# Patient Record
Sex: FEMALE
[De-identification: names, ages, dates, MRNs, and addresses within clinical notes are randomized per-mention and may not be internally consistent; named-entity substitution may affect disease eponyms.]

---

## 2017-10-12 ENCOUNTER — ASOB RESULT (OUTPATIENT)
Age: 27
End: 2017-10-12

## 2017-10-12 ENCOUNTER — APPOINTMENT (OUTPATIENT)
Dept: ANTEPARTUM | Facility: CLINIC | Age: 27
End: 2017-10-12
Payer: COMMERCIAL

## 2017-10-12 PROBLEM — Z00.00 ENCOUNTER FOR PREVENTIVE HEALTH EXAMINATION: Status: ACTIVE | Noted: 2017-10-12

## 2017-10-12 PROCEDURE — 76830 TRANSVAGINAL US NON-OB: CPT

## 2017-10-12 PROCEDURE — 76857 US EXAM PELVIC LIMITED: CPT

## 2022-01-25 ENCOUNTER — APPOINTMENT (OUTPATIENT)
Dept: GASTROENTEROLOGY | Facility: CLINIC | Age: 32
End: 2022-01-25

## 2022-04-12 ENCOUNTER — RESULT REVIEW (OUTPATIENT)
Age: 32
End: 2022-04-12

## 2022-07-06 ENCOUNTER — OFFICE (OUTPATIENT)
Dept: URBAN - METROPOLITAN AREA CLINIC 34 | Facility: CLINIC | Age: 32
Setting detail: OPHTHALMOLOGY
End: 2022-07-06
Payer: MEDICAID

## 2022-07-06 DIAGNOSIS — H00.025: ICD-10-CM

## 2022-07-06 DIAGNOSIS — H16.223: ICD-10-CM

## 2022-07-06 DIAGNOSIS — R51.9: ICD-10-CM

## 2022-07-06 PROCEDURE — 92004 COMPRE OPH EXAM NEW PT 1/>: CPT | Performed by: OPHTHALMOLOGY

## 2022-07-06 ASSESSMENT — REFRACTION_CURRENTRX
OS_VPRISM_DIRECTION: SV
OD_OVR_VA: 20/
OS_CYLINDER: SPH
OD_CYLINDER: SPH
OS_OVR_VA: 20/
OD_VPRISM_DIRECTION: SV
OD_SPHERE: -0.75
OS_SPHERE: -1.00

## 2022-07-06 ASSESSMENT — REFRACTION_AUTOREFRACTION
OS_SPHERE: -1.75
OS_AXIS: 120
OD_AXIS: 010
OD_CYLINDER: +0.25
OS_CYLINDER: +0.50
OD_SPHERE: -1.25

## 2022-07-06 ASSESSMENT — KERATOMETRY
OD_K1POWER_DIOPTERS: 43.75
OS_K1POWER_DIOPTERS: 43.25
OD_K2POWER_DIOPTERS: 44.75
OS_K2POWER_DIOPTERS: 44.25
OS_AXISANGLE_DEGREES: 101
OD_AXISANGLE_DEGREES: 083

## 2022-07-06 ASSESSMENT — SPHEQUIV_DERIVED
OD_SPHEQUIV: -1.125
OS_SPHEQUIV: -1.5

## 2022-07-06 ASSESSMENT — AXIALLENGTH_DERIVED
OS_AL: 24.095
OD_AL: 23.7559

## 2022-07-06 ASSESSMENT — LID EXAM ASSESSMENTS
OS_MEIBOMITIS: LLL T
OS_COMMENTS: TELANGIECTASIA, HYPEREMIA, SHORTENED GLANDS
OD_COMMENTS: TELANGIECTASIA, HYPEREMIA

## 2022-07-06 ASSESSMENT — VISUAL ACUITY
OD_BCVA: 20/20-2
OS_BCVA: 20/20

## 2022-07-06 ASSESSMENT — CONFRONTATIONAL VISUAL FIELD TEST (CVF)
OD_FINDINGS: FULL
OS_FINDINGS: FULL

## 2022-07-06 ASSESSMENT — TONOMETRY: OD_IOP_MMHG: 20

## 2023-05-08 ENCOUNTER — APPOINTMENT (OUTPATIENT)
Dept: OBGYN | Facility: CLINIC | Age: 33
End: 2023-05-08
Payer: MEDICAID

## 2023-05-08 VITALS
SYSTOLIC BLOOD PRESSURE: 110 MMHG | DIASTOLIC BLOOD PRESSURE: 70 MMHG | HEIGHT: 62 IN | BODY MASS INDEX: 30.55 KG/M2 | WEIGHT: 166 LBS

## 2023-05-08 DIAGNOSIS — Z78.9 OTHER SPECIFIED HEALTH STATUS: ICD-10-CM

## 2023-05-08 DIAGNOSIS — Z01.419 ENCOUNTER FOR GYNECOLOGICAL EXAMINATION (GENERAL) (ROUTINE) W/OUT ABNORMAL FINDINGS: ICD-10-CM

## 2023-05-08 DIAGNOSIS — O02.1 MISSED ABORTION: ICD-10-CM

## 2023-05-08 LAB
BILIRUB UR QL STRIP: NORMAL
GLUCOSE UR-MCNC: NORMAL
HCG UR QL: 0.2 EU/DL
HGB UR QL STRIP.AUTO: NORMAL
KETONES UR-MCNC: NORMAL
LEUKOCYTE ESTERASE UR QL STRIP: NORMAL
NITRITE UR QL STRIP: NORMAL
PH UR STRIP: 6
PROT UR STRIP-MCNC: NORMAL
SP GR UR STRIP: 1

## 2023-05-08 PROCEDURE — 81003 URINALYSIS AUTO W/O SCOPE: CPT | Mod: QW

## 2023-05-08 PROCEDURE — 99385 PREV VISIT NEW AGE 18-39: CPT

## 2023-05-09 LAB
APPEARANCE: CLEAR
BACTERIA: NEGATIVE /HPF
BILIRUBIN URINE: NEGATIVE
BLOOD URINE: NEGATIVE
C TRACH RRNA SPEC QL NAA+PROBE: NOT DETECTED
CAST: 0 /LPF
COLOR: YELLOW
EPITHELIAL CELLS: 6 /HPF
GLUCOSE QUALITATIVE U: NEGATIVE MG/DL
HPV HIGH+LOW RISK DNA PNL CVX: NOT DETECTED
KETONES URINE: NEGATIVE MG/DL
LEUKOCYTE ESTERASE URINE: NEGATIVE
MICROSCOPIC-UA: NORMAL
N GONORRHOEA RRNA SPEC QL NAA+PROBE: NOT DETECTED
NITRITE URINE: NEGATIVE
PH URINE: 6.5
PROTEIN URINE: NEGATIVE MG/DL
RED BLOOD CELLS URINE: 1 /HPF
SOURCE AMPLIFICATION: NORMAL
SOURCE TP AMPLIFICATION: NORMAL
SPECIFIC GRAVITY URINE: 1.01
T VAGINALIS RRNA SPEC QL NAA+PROBE: NOT DETECTED
UROBILINOGEN URINE: 0.2 MG/DL
WHITE BLOOD CELLS URINE: 0 /HPF

## 2023-05-10 RX ORDER — AMOXICILLIN AND CLAVULANATE POTASSIUM 875; 125 MG/1; MG/1
875-125 TABLET, COATED ORAL
Qty: 14 | Refills: 0 | Status: COMPLETED | COMMUNITY
Start: 2023-03-15

## 2023-05-10 RX ORDER — RIZATRIPTAN BENZOATE 10 MG/1
10 TABLET ORAL
Qty: 9 | Refills: 0 | Status: COMPLETED | COMMUNITY
Start: 2023-01-22

## 2023-05-10 RX ORDER — NITROFURANTOIN (MONOHYDRATE/MACROCRYSTALS) 25; 75 MG/1; MG/1
100 CAPSULE ORAL
Qty: 10 | Refills: 0 | Status: COMPLETED | COMMUNITY
Start: 2023-04-30

## 2023-05-10 NOTE — REVIEW OF SYSTEMS
[Fatigue] : fatigue [Dyspnea] : dyspnea [SOB on Exertion] : shortness of breath on exertion [Chest Pain] : chest pain [Urgency] : urgency [Frequency] : frequency [Incontinence] : incontinence [Dysuria] : dysuria [Pelvic pain] : pelvic pain [Negative] : Heme/Lymph

## 2023-05-10 NOTE — HISTORY OF PRESENT ILLNESS
[FreeTextEntry1] : 33yo lmp 2023.\par \par Check up.  C/o chest pain, headaches, frequency, urgency, dysuria, dysparunia.  [PGHxTotal] : 4 [[facility] : 3 [PGHxPremature] : 0 [PGHxAbortions] : 1 [[facility]] [facility] [PGHxABSpont] : 1 [Regular Cycle Intervals] : periods have been regular [Frequency: Q ___ days] : menstrual periods occur approximately every [unfilled] days [Menarche Age: ____] : age at menarche was [unfilled] [Currently Active] : currently active [Men] : men [Vaginal] : vaginal

## 2023-05-11 ENCOUNTER — NON-APPOINTMENT (OUTPATIENT)
Age: 33
End: 2023-05-11

## 2023-05-11 LAB — CYTOLOGY CVX/VAG DOC THIN PREP: NORMAL

## 2023-05-11 RX ORDER — CEPHALEXIN 500 MG/1
500 CAPSULE ORAL 3 TIMES DAILY
Qty: 21 | Refills: 0 | Status: ACTIVE | COMMUNITY
Start: 2023-05-11 | End: 1900-01-01

## 2023-05-12 LAB — BACTERIA UR CULT: ABNORMAL

## 2023-05-30 ENCOUNTER — APPOINTMENT (OUTPATIENT)
Dept: OBGYN | Facility: CLINIC | Age: 33
End: 2023-05-30
Payer: MEDICAID

## 2023-05-30 VITALS
BODY MASS INDEX: 30.55 KG/M2 | HEIGHT: 62 IN | WEIGHT: 166 LBS | SYSTOLIC BLOOD PRESSURE: 110 MMHG | DIASTOLIC BLOOD PRESSURE: 60 MMHG

## 2023-05-30 DIAGNOSIS — R10.2 PELVIC AND PERINEAL PAIN: ICD-10-CM

## 2023-05-30 DIAGNOSIS — D25.9 LEIOMYOMA OF UTERUS, UNSPECIFIED: ICD-10-CM

## 2023-05-30 DIAGNOSIS — R35.0 FREQUENCY OF MICTURITION: ICD-10-CM

## 2023-05-30 PROCEDURE — 76830 TRANSVAGINAL US NON-OB: CPT

## 2023-05-30 PROCEDURE — 99213 OFFICE O/P EST LOW 20 MIN: CPT | Mod: 25

## 2023-05-30 PROCEDURE — 81025 URINE PREGNANCY TEST: CPT

## 2023-05-30 NOTE — REVIEW OF SYSTEMS
[Fatigue] : fatigue [Dyspnea] : dyspnea [SOB on Exertion] : shortness of breath on exertion [Chest Pain] : chest pain [Negative] : Heme/Lymph

## 2023-05-30 NOTE — PLAN
[FreeTextEntry1] : Discussed bladder hygiene.\par Frequent urination.\par Increase po water intake.\par Void before and after sex.\par Acidify urine with lemon and cranberry.\par

## 2023-05-30 NOTE — HISTORY OF PRESENT ILLNESS
[FreeTextEntry1] : Bladder symptoms resolved after abx, desires retest.  Pelvic pressure pain much improved.

## 2023-05-31 LAB
HCG UR QL: NEGATIVE
QUALITY CONTROL: YES

## 2023-06-04 LAB — BACTERIA UR CULT: NORMAL

## 2024-09-09 ENCOUNTER — LABORATORY RESULT (OUTPATIENT)
Age: 34
End: 2024-09-09

## 2024-09-09 ENCOUNTER — OUTPATIENT (OUTPATIENT)
Dept: OUTPATIENT SERVICES | Facility: HOSPITAL | Age: 34
LOS: 1 days | End: 2024-09-09
Payer: SELF-PAY

## 2024-09-09 ENCOUNTER — MED ADMIN CHARGE (OUTPATIENT)
Age: 34
End: 2024-09-09

## 2024-09-09 ENCOUNTER — APPOINTMENT (OUTPATIENT)
Dept: OBGYN | Facility: CLINIC | Age: 34
End: 2024-09-09
Payer: SELF-PAY

## 2024-09-09 VITALS — DIASTOLIC BLOOD PRESSURE: 70 MMHG | WEIGHT: 176 LBS | SYSTOLIC BLOOD PRESSURE: 116 MMHG | BODY MASS INDEX: 32.19 KG/M2

## 2024-09-09 DIAGNOSIS — N81.89 OTHER FEMALE GENITAL PROLAPSE: ICD-10-CM

## 2024-09-09 DIAGNOSIS — N76.0 ACUTE VAGINITIS: ICD-10-CM

## 2024-09-09 DIAGNOSIS — Z11.3 ENCOUNTER FOR SCREENING FOR INFECTIONS WITH A PREDOMINANTLY SEXUAL MODE OF TRANSMISSION: ICD-10-CM

## 2024-09-09 DIAGNOSIS — Z01.419 ENCOUNTER FOR GYNECOLOGICAL EXAMINATION (GENERAL) (ROUTINE) W/OUT ABNORMAL FINDINGS: ICD-10-CM

## 2024-09-09 LAB
BILIRUB UR QL STRIP: NORMAL
CLARITY UR: CLEAR
COLLECTION METHOD: NORMAL
GLUCOSE UR-MCNC: NORMAL
HCG UR QL: 0.2 EU/DL
HCG UR QL: NEGATIVE
HGB UR QL STRIP.AUTO: NORMAL
KETONES UR-MCNC: NORMAL
LEUKOCYTE ESTERASE UR QL STRIP: NORMAL
NITRITE UR QL STRIP: NORMAL
PH UR STRIP: 6.5
PROT UR STRIP-MCNC: NORMAL
QUALITY CONTROL: YES
SP GR UR STRIP: 1.01

## 2024-09-09 PROCEDURE — 87340 HEPATITIS B SURFACE AG IA: CPT

## 2024-09-09 PROCEDURE — 86803 HEPATITIS C AB TEST: CPT

## 2024-09-09 PROCEDURE — 86780 TREPONEMA PALLIDUM: CPT

## 2024-09-09 PROCEDURE — G0444 DEPRESSION SCREEN ANNUAL: CPT | Mod: 59

## 2024-09-09 PROCEDURE — G0463: CPT

## 2024-09-09 PROCEDURE — 36415 COLL VENOUS BLD VENIPUNCTURE: CPT

## 2024-09-09 PROCEDURE — 81025 URINE PREGNANCY TEST: CPT

## 2024-09-09 PROCEDURE — 87389 HIV-1 AG W/HIV-1&-2 AB AG IA: CPT

## 2024-09-09 PROCEDURE — 81002 URINALYSIS NONAUTO W/O SCOPE: CPT

## 2024-09-09 PROCEDURE — 99385 PREV VISIT NEW AGE 18-39: CPT | Mod: 25

## 2024-09-09 PROCEDURE — 90471 IMMUNIZATION ADMIN: CPT

## 2024-09-10 LAB
C TRACH RRNA SPEC QL NAA+PROBE: NOT DETECTED
HBV SURFACE AG SER-ACNC: SIGNIFICANT CHANGE UP
HCV AB S/CO SERPL IA: 0.13 S/CO — SIGNIFICANT CHANGE UP (ref 0–0.99)
HCV AB SERPL-IMP: SIGNIFICANT CHANGE UP
HIV 1+2 AB+HIV1 P24 AG SERPL QL IA: SIGNIFICANT CHANGE UP
N GONORRHOEA RRNA SPEC QL NAA+PROBE: NOT DETECTED
SOURCE AMPLIFICATION: NORMAL
T PALLIDUM AB TITR SER: NEGATIVE — SIGNIFICANT CHANGE UP

## 2024-09-11 LAB
BACTERIA UR CULT: NORMAL
HPV HIGH+LOW RISK DNA PNL CVX: NOT DETECTED

## 2024-09-11 NOTE — HISTORY OF PRESENT ILLNESS
[Definite:  ___ (Date)] : the last menstrual period was [unfilled] [Normal Amount/Duration] : was of a normal amount and duration [Regular Cycle Intervals] : periods have been regular [Sexually Active] : is sexually active [Contraception] : uses contraception [Tubal Ligation] : has had a tubal ligation [Spotting Between  Menses] : no spotting between menses [Menstrual Cramps] : no menstrual cramps

## 2024-09-11 NOTE — PHYSICAL EXAM
[Chaperone Present] : A chaperone was present in the examining room during all aspects of the physical examination [35779] : A chaperone was present during the pelvic exam. [Awake] : awake [Alert] : alert [Soft] : soft [Oriented x3] : oriented to person, place, and time [Normal] : uterus [Labia Majora] : labia major [Labia Minora] : labia minora [No Bleeding] : there was no active vaginal bleeding [Pap Obtained] : a Pap smear was performed [Normal Position] : in a normal position [Uterine Adnexae] : were not tender and not enlarged [FreeTextEntry2] : Lisbeth WALKER [FreeTextEntry3] : physical exam by Dariana Wise NP [Acute Distress] : no acute distress [Mass] : no breast mass [Nipple Discharge] : no nipple discharge [Axillary LAD] : no axillary lymphadenopathy [Tender] : non tender [Depressed Mood] : not depressed [Flat Affect] : affect not flat [Urethrocele] : no urethrocele [Motion Tenderness] : there was no cervical motion tenderness [Tenderness] : nontender [FreeTextEntry6] : no fullness, tenderness or masses

## 2024-09-11 NOTE — PHYSICAL EXAM
[Chaperone Present] : A chaperone was present in the examining room during all aspects of the physical examination [21112] : A chaperone was present during the pelvic exam. [Awake] : awake [Alert] : alert [Soft] : soft [Oriented x3] : oriented to person, place, and time [Normal] : uterus [Labia Majora] : labia major [Labia Minora] : labia minora [No Bleeding] : there was no active vaginal bleeding [Pap Obtained] : a Pap smear was performed [Normal Position] : in a normal position [Uterine Adnexae] : were not tender and not enlarged [FreeTextEntry2] : Lisbeth WALKER [FreeTextEntry3] : physical exam by Dariana Wise NP [Acute Distress] : no acute distress [Mass] : no breast mass [Nipple Discharge] : no nipple discharge [Axillary LAD] : no axillary lymphadenopathy [Tender] : non tender [Depressed Mood] : not depressed [Flat Affect] : affect not flat [Urethrocele] : no urethrocele [Motion Tenderness] : there was no cervical motion tenderness [Tenderness] : nontender [FreeTextEntry6] : no fullness, tenderness or masses

## 2024-09-11 NOTE — BEGINNING OF VISIT
[Patient] : patient [] :  [Ad Hoc ] : provided by an ad hoc  [Time Spent: ____ minutes] : Total time spent using  services: [unfilled] minutes. The patient's primary language is not English thus required  services. [TWNoteComboBox1] : Green[place]ic

## 2024-09-11 NOTE — BEGINNING OF VISIT
[Patient] : patient [] :  [Ad Hoc ] : provided by an ad hoc  [Time Spent: ____ minutes] : Total time spent using  services: [unfilled] minutes. The patient's primary language is not English thus required  services. [TWNoteComboBox1] : Togolese

## 2024-09-13 LAB — CYTOLOGY CVX/VAG DOC THIN PREP: NORMAL

## 2024-09-16 DIAGNOSIS — Z11.3 ENCOUNTER FOR SCREENING FOR INFECTIONS WITH A PREDOMINANTLY SEXUAL MODE OF TRANSMISSION: ICD-10-CM

## 2024-09-16 DIAGNOSIS — Z23 ENCOUNTER FOR IMMUNIZATION: ICD-10-CM

## 2024-09-16 DIAGNOSIS — N81.89 OTHER FEMALE GENITAL PROLAPSE: ICD-10-CM

## 2024-09-16 DIAGNOSIS — Z01.419 ENCOUNTER FOR GYNECOLOGICAL EXAMINATION (GENERAL) (ROUTINE) WITHOUT ABNORMAL FINDINGS: ICD-10-CM

## 2024-09-16 DIAGNOSIS — R35.0 FREQUENCY OF MICTURITION: ICD-10-CM

## 2024-11-11 ENCOUNTER — MED ADMIN CHARGE (OUTPATIENT)
Age: 34
End: 2024-11-11

## 2024-11-11 ENCOUNTER — OUTPATIENT (OUTPATIENT)
Dept: OUTPATIENT SERVICES | Facility: HOSPITAL | Age: 34
LOS: 1 days | End: 2024-11-11
Payer: SELF-PAY

## 2024-11-11 ENCOUNTER — NON-APPOINTMENT (OUTPATIENT)
Age: 34
End: 2024-11-11

## 2024-11-11 ENCOUNTER — APPOINTMENT (OUTPATIENT)
Dept: OBGYN | Facility: CLINIC | Age: 34
End: 2024-11-11

## 2024-11-11 PROCEDURE — 90651 9VHPV VACCINE 2/3 DOSE IM: CPT

## 2024-11-11 PROCEDURE — 90471 IMMUNIZATION ADMIN: CPT

## 2024-11-11 PROCEDURE — 90651 9VHPV VACCINE 2/3 DOSE IM: CPT | Mod: NC

## 2024-11-12 DIAGNOSIS — N76.0 ACUTE VAGINITIS: ICD-10-CM

## 2024-11-25 DIAGNOSIS — Z23 ENCOUNTER FOR IMMUNIZATION: ICD-10-CM
